# Patient Record
Sex: MALE | URBAN - METROPOLITAN AREA
[De-identification: names, ages, dates, MRNs, and addresses within clinical notes are randomized per-mention and may not be internally consistent; named-entity substitution may affect disease eponyms.]

---

## 2017-08-01 ENCOUNTER — OFFICE VISIT - RIVER FALLS (OUTPATIENT)
Dept: FAMILY MEDICINE | Facility: CLINIC | Age: 22
End: 2017-08-01

## 2017-08-01 ASSESSMENT — MIFFLIN-ST. JEOR: SCORE: 1892.99

## 2022-02-11 VITALS
DIASTOLIC BLOOD PRESSURE: 78 MMHG | WEIGHT: 189.4 LBS | BODY MASS INDEX: 25.1 KG/M2 | SYSTOLIC BLOOD PRESSURE: 130 MMHG | HEIGHT: 73 IN | TEMPERATURE: 98.6 F | HEART RATE: 80 BPM

## 2022-02-15 NOTE — PROGRESS NOTES
Patient:   ODALYS CHAVIS            MRN: [id number]            FIN: [id number]               Age:   22 years     Sex:  Male     :  1995   Associated Diagnoses:   Allergic contact dermatitis   Author:   Estefani Dowling MD      Visit Information      Date of Service: 2017 06:47 pm  Performing Location: [facility]  Encounter#: [id number]      Primary Care Provider (PCP):  NONE ,       Referring Provider:  Estefani Dowling MD    NPI# [id number]      Chief Complaint   2017 6:53 PM CDT     Patient is here with possible poison ivy. Has been present for over a week and continues to get worse and spread.        History of Present Illness             The patient presents with rash.  The location of the rash is generalized.  The rash is described as red, oozing, moist, itching and vesicular.  The severity of the symptom(s) associated to the rash is moderate.  The timing/ course of symptom(s) related to the rash is constant and worsening.  The rash has lasted for 1 week(s).  The context of the rash: occurred after being in a ditch.  The rash is becoming more generalized.  Exacerbating factors consist of none.  Relieving factors consist of none.  Associated symptoms consist of denies chills, denies cold intolerance, denies dry skin, denies fatigue, denies fever, denies hair loss, denies joint stiffness, denies myalgia, denies nail problems, denies paresthesia and denies shortness of breath.  Additional pertinent history: spouse has similar symptoms following same exposure.        Review of Systems   Constitutional:  Negative.    Eye:  Negative.    Ear/Nose/Mouth/Throat:  Negative.    Respiratory:  Negative.    Cardiovascular:  Negative.    Breast:  Negative.    Gastrointestinal:  Negative except as documented in history of present illness.    Genitourinary:  Negative except as documented in history of present illness.    Hematology/Lymphatics:  Negative.    Endocrine:  Negative.     Immunologic:  Negative.    Musculoskeletal:  Negative.    Integumentary:  Negative except as documented in history of present illness.    Neurologic:  Negative.    Psychiatric:  Negative.          All other systems reviewed and negative      Health Status   Allergies:    Allergic Reactions (Selected)  No known allergies   Medications:  (Selected)   Prescriptions  Prescribed  predniSONE 10 mg oral tablet: See Instructions, Instructions: 6tab PO Qday x 3day then 4 Qday x 3day then 3PO Qday x 3 days then 2 qday x 3 days then 1 tab qday x 3 days then 1/2 tab qday x 3 days, # 50 EA, 0 Refill(s), Type: Maintenance, Pharmacy: [facility], 6tab PO Qday x 3da...      Histories   Past Medical History:    No active or resolved past medical history items have been selected or recorded.   Family History:    No family history items have been selected or recorded.   Procedure history:    No active procedure history items have been selected or recorded., pt has had oral surgeries, no complications to anesthesias   Social History:             No active social history items have been recorded., , works at [employer], nonsmoker, occasional social EtOH, no illicit drogs of abuse      Physical Examination   Vital Signs   8/1/2017 6:53 PM CDT Temperature Tympanic 98.6 DegF    Peripheral Pulse Rate 80 bpm    Pulse Site Radial artery    HR Method Manual    Systolic Blood Pressure 130 mmHg    Diastolic Blood Pressure 78 mmHg    Mean Arterial Pressure 95 mmHg    BP Site Right arm    BP Method Manual      Measurements from flowsheet : Measurements   8/1/2017 6:53 PM CDT Height Measured - Standard 73 in    Weight Measured - Standard 189.4 lb    BSA 2.1 m2    Body Mass Index 24.99 kg/m2      General:  Alert and oriented, No acute distress.    Eye:  Pupils are equal, round and reactive to light, Extraocular movements are intact, Normal conjunctiva.    HENT:  Normocephalic, hearing grossly normal during our conversation.     Respiratory:  Lungs are clear to auscultation, Respirations are non-labored, Symmetrical chest wall expansion.    Cardiovascular:  Normal rate, Regular rhythm, Normal peripheral perfusion.    Musculoskeletal:  Normal gait.    Integumentary:  Warm, No pallor.         Integumentary exam: Bilateral, Neck, Back, Chest, Arm, Abdomen, Leg, Rash ( Vesicular, Consistent with ( Poison ivy ), with some areas of excoriation and some induration and some uriticaria ).    Neurologic:  Alert, Oriented.    Psychiatric:  Cooperative, Appropriate mood & affect, Normal judgment, Non-suicidal.       Health Maintenance      Recommendations     Pending (in the next year)        Due            Alcohol Misuse Screen (Male) due  08/01/17  and every 1  year(s)           Depression Screen (Male) due  08/01/17  and every 1  year(s)           HIV Screen (if sexually active) (Male) due  08/01/17  and every 1  year(s)           STD Counseling (if sexually active) (Male) due  08/01/17  and every 1  year(s)           Syphilis Screen (if sexually active) (Male) due  08/01/17  and every 1  year(s)           Tetanus Vaccine due  08/01/17  and every 10  year(s)     Satisfied (in the past 1 year)        Satisfied            Body Mass Index Check (Male) on  08/01/17.           High Blood Pressure Screen (Male) on  08/01/17.           Tobacco Use Screen (Male) on  08/01/17.        Impression and Plan   Diagnosis     Allergic contact dermatitis ([id number]).     Course:  Progressing as expected.    Orders     Orders (Selected)   Prescriptions  Prescribed  predniSONE 10 mg oral tablet: See Instructions, Instructions: 6tab PO Qday x 3day then 4 Qday x 3day then 3PO Qday x 3 days then 2 qday x 3 days then 1 tab qday x 3 days then 1/2 tab qday x 3 days, # 50 EA, 0 Refill(s), Type: Maintenance, Pharmacy: [facility], 6tab PO Qday x 3da....     Diagnosis     return to clinic if symptoms worsen or do not improve.